# Patient Record
Sex: MALE | Race: WHITE | Employment: FULL TIME | URBAN - METROPOLITAN AREA
[De-identification: names, ages, dates, MRNs, and addresses within clinical notes are randomized per-mention and may not be internally consistent; named-entity substitution may affect disease eponyms.]

---

## 2019-04-27 ENCOUNTER — APPOINTMENT (OUTPATIENT)
Dept: CT IMAGING | Age: 34
End: 2019-04-27
Attending: NURSE PRACTITIONER
Payer: SELF-PAY

## 2019-04-27 ENCOUNTER — HOSPITAL ENCOUNTER (EMERGENCY)
Age: 34
Discharge: LEFT BEFORE DISCHARGE | End: 2019-04-27
Attending: EMERGENCY MEDICINE | Admitting: EMERGENCY MEDICINE
Payer: SELF-PAY

## 2019-04-27 VITALS
DIASTOLIC BLOOD PRESSURE: 107 MMHG | WEIGHT: 305 LBS | HEIGHT: 68 IN | HEART RATE: 114 BPM | TEMPERATURE: 99.8 F | SYSTOLIC BLOOD PRESSURE: 164 MMHG | BODY MASS INDEX: 46.23 KG/M2 | RESPIRATION RATE: 17 BRPM | OXYGEN SATURATION: 99 %

## 2019-04-27 DIAGNOSIS — R19.7 DIARRHEA, UNSPECIFIED TYPE: ICD-10-CM

## 2019-04-27 DIAGNOSIS — R10.12 ABDOMINAL PAIN, LUQ (LEFT UPPER QUADRANT): Primary | ICD-10-CM

## 2019-04-27 LAB
ALBUMIN SERPL-MCNC: 4.1 G/DL (ref 3.5–5)
ALBUMIN/GLOB SERPL: 1.1 {RATIO} (ref 1.1–2.2)
ALP SERPL-CCNC: 98 U/L (ref 45–117)
ALT SERPL-CCNC: 35 U/L (ref 12–78)
ANION GAP SERPL CALC-SCNC: 6 MMOL/L (ref 5–15)
APPEARANCE UR: CLEAR
AST SERPL-CCNC: 16 U/L (ref 15–37)
BASOPHILS # BLD: 0 K/UL (ref 0–0.1)
BASOPHILS NFR BLD: 0 % (ref 0–1)
BILIRUB SERPL-MCNC: 0.6 MG/DL (ref 0.2–1)
BILIRUB UR QL: NEGATIVE
BUN SERPL-MCNC: 11 MG/DL (ref 6–20)
BUN/CREAT SERPL: 9 (ref 12–20)
CALCIUM SERPL-MCNC: 9.1 MG/DL (ref 8.5–10.1)
CHLORIDE SERPL-SCNC: 104 MMOL/L (ref 97–108)
CO2 SERPL-SCNC: 26 MMOL/L (ref 21–32)
COLOR UR: NORMAL
COMMENT, HOLDF: NORMAL
CREAT SERPL-MCNC: 1.21 MG/DL (ref 0.7–1.3)
DIFFERENTIAL METHOD BLD: ABNORMAL
EOSINOPHIL # BLD: 0 K/UL (ref 0–0.4)
EOSINOPHIL NFR BLD: 0 % (ref 0–7)
ERYTHROCYTE [DISTWIDTH] IN BLOOD BY AUTOMATED COUNT: 12.6 % (ref 11.5–14.5)
GLOBULIN SER CALC-MCNC: 3.9 G/DL (ref 2–4)
GLUCOSE SERPL-MCNC: 92 MG/DL (ref 65–100)
GLUCOSE UR STRIP.AUTO-MCNC: NEGATIVE MG/DL
HCT VFR BLD AUTO: 47.1 % (ref 36.6–50.3)
HGB BLD-MCNC: 15.5 G/DL (ref 12.1–17)
HGB UR QL STRIP: NEGATIVE
IMM GRANULOCYTES # BLD AUTO: 0 K/UL (ref 0–0.04)
IMM GRANULOCYTES NFR BLD AUTO: 0 % (ref 0–0.5)
KETONES UR QL STRIP.AUTO: NEGATIVE MG/DL
LEUKOCYTE ESTERASE UR QL STRIP.AUTO: NEGATIVE
LIPASE SERPL-CCNC: 66 U/L (ref 73–393)
LYMPHOCYTES # BLD: 1.2 K/UL (ref 0.8–3.5)
LYMPHOCYTES NFR BLD: 13 % (ref 12–49)
MCH RBC QN AUTO: 29.5 PG (ref 26–34)
MCHC RBC AUTO-ENTMCNC: 32.9 G/DL (ref 30–36.5)
MCV RBC AUTO: 89.7 FL (ref 80–99)
MONOCYTES # BLD: 0.5 K/UL (ref 0–1)
MONOCYTES NFR BLD: 5 % (ref 5–13)
NEUTS SEG # BLD: 7.5 K/UL (ref 1.8–8)
NEUTS SEG NFR BLD: 82 % (ref 32–75)
NITRITE UR QL STRIP.AUTO: NEGATIVE
NRBC # BLD: 0 K/UL (ref 0–0.01)
NRBC BLD-RTO: 0 PER 100 WBC
PH UR STRIP: 5.5 [PH] (ref 5–8)
PLATELET # BLD AUTO: 179 K/UL (ref 150–400)
PMV BLD AUTO: 9.7 FL (ref 8.9–12.9)
POTASSIUM SERPL-SCNC: 3.9 MMOL/L (ref 3.5–5.1)
PROT SERPL-MCNC: 8 G/DL (ref 6.4–8.2)
PROT UR STRIP-MCNC: NEGATIVE MG/DL
RBC # BLD AUTO: 5.25 M/UL (ref 4.1–5.7)
SAMPLES BEING HELD,HOLD: NORMAL
SODIUM SERPL-SCNC: 136 MMOL/L (ref 136–145)
SP GR UR REFRACTOMETRY: 1.03 (ref 1–1.03)
UROBILINOGEN UR QL STRIP.AUTO: 0.2 EU/DL (ref 0.2–1)
WBC # BLD AUTO: 9.3 K/UL (ref 4.1–11.1)

## 2019-04-27 PROCEDURE — 85025 COMPLETE CBC W/AUTO DIFF WBC: CPT

## 2019-04-27 PROCEDURE — 81003 URINALYSIS AUTO W/O SCOPE: CPT

## 2019-04-27 PROCEDURE — 96372 THER/PROPH/DIAG INJ SC/IM: CPT

## 2019-04-27 PROCEDURE — 74011250636 HC RX REV CODE- 250/636: Performed by: NURSE PRACTITIONER

## 2019-04-27 PROCEDURE — 74011250637 HC RX REV CODE- 250/637: Performed by: NURSE PRACTITIONER

## 2019-04-27 PROCEDURE — 36415 COLL VENOUS BLD VENIPUNCTURE: CPT

## 2019-04-27 PROCEDURE — 80053 COMPREHEN METABOLIC PANEL: CPT

## 2019-04-27 PROCEDURE — 83690 ASSAY OF LIPASE: CPT

## 2019-04-27 PROCEDURE — 99283 EMERGENCY DEPT VISIT LOW MDM: CPT

## 2019-04-27 RX ORDER — FAMOTIDINE 20 MG/1
20 TABLET, FILM COATED ORAL 2 TIMES DAILY
Qty: 20 TAB | Refills: 0 | Status: SHIPPED | OUTPATIENT
Start: 2019-04-27 | End: 2019-05-07

## 2019-04-27 RX ORDER — ONDANSETRON 4 MG/1
8 TABLET, ORALLY DISINTEGRATING ORAL
Status: COMPLETED | OUTPATIENT
Start: 2019-04-27 | End: 2019-04-27

## 2019-04-27 RX ORDER — DICYCLOMINE HYDROCHLORIDE 10 MG/ML
20 INJECTION INTRAMUSCULAR
Status: COMPLETED | OUTPATIENT
Start: 2019-04-27 | End: 2019-04-27

## 2019-04-27 RX ORDER — ONDANSETRON 4 MG/1
4 TABLET, FILM COATED ORAL
Qty: 10 TAB | Refills: 0 | Status: SHIPPED | OUTPATIENT
Start: 2019-04-27 | End: 2022-10-16

## 2019-04-27 RX ADMIN — DICYCLOMINE HYDROCHLORIDE 20 MG: 20 INJECTION, SOLUTION INTRAMUSCULAR at 11:31

## 2019-04-27 RX ADMIN — ONDANSETRON 8 MG: 4 TABLET, ORALLY DISINTEGRATING ORAL at 11:32

## 2019-04-27 NOTE — ED TRIAGE NOTES
Pt states that he has had diarrhea for the past 4 days and developed abd pain across his upper abd.  Pt states that he has had no N/V.

## 2019-04-27 NOTE — ED PROVIDER NOTES
Pt is a 34 y/o male with a pmh of HTN who presents today with c/o sharp stabbing pain to LUQ abdomen that radiates to epigastric area that started last night. Woke him up from sleep. Comes and goes. Lastsfor approx 2 minutes then resolves. Has not eaten so unsure if food makes it worse but he went to work and was on a piece of heavy machinery of which the bouncing made it worse. No associated n/v/f but reports having chills. He does report non bloody diarrhea that started 4 days ago. Multiple episodes yesterday. Once today. He has taken no medications for this. No other complaints. Past Medical History:   Diagnosis Date    Hypertension     Obesity, morbid, BMI 40.0-49.9 ([facility] [address]     2016       Past Surgical History:   Procedure Laterality Date    HX ORTHOPAEDIC      R knee         Family History:   Problem Relation Age of Onset    Hypertension Other        Social History     Socioeconomic History    Marital status: SINGLE     Spouse name: Not on file    Number of children: Not on file    Years of education: Not on file    Highest education level: Not on file   Occupational History    Not on file   Social Needs    Financial resource strain: Not on file    Food insecurity:     Worry: Not on file     Inability: Not on file    Transportation needs:     Medical: Not on file     Non-medical: Not on file   Tobacco Use    Smoking status: Current Every Day Smoker   Substance and Sexual Activity    Alcohol use:  Yes    Drug use: No    Sexual activity: Not on file   Lifestyle    Physical activity:     Days per week: Not on file     Minutes per session: Not on file    Stress: Not on file   Relationships    Social connections:     Talks on phone: Not on file     Gets together: Not on file     Attends Restoration service: Not on file     Active member of club or organization: Not on file     Attends meetings of clubs or organizations: Not on file     Relationship status: Not on file    Intimate partner violence:     Fear of current or ex partner: Not on file     Emotionally abused: Not on file     Physically abused: Not on file     Forced sexual activity: Not on file   Other Topics Concern    Not on file   Social History Narrative    Not on file         ALLERGIES: Patient has no known allergies. Review of Systems   Constitutional: Positive for chills. Negative for appetite change and fever. HENT: Negative. Respiratory: Negative for chest tightness and shortness of breath. Cardiovascular: Negative for chest pain. Gastrointestinal: Positive for abdominal pain and diarrhea. Negative for nausea and vomiting. Genitourinary: Negative for difficulty urinating. Musculoskeletal: Negative. Skin: Negative. Neurological: Negative for dizziness and headaches. All other systems reviewed and are negative. Vitals:    04/27/19 0944 04/27/19 0955   BP:  159/85   Pulse: (!) 108 (!) 107   Resp:  18   Temp:  98.6 °F (37 °C)   SpO2: 99% 98%   Weight:  124.3 kg (274 lb 0.5 oz)   Height:  5' 8\" (1.727 m)            Physical Exam   Constitutional: He is oriented to person, place, and time. He appears well-developed and well-nourished. Cardiovascular: Normal rate, regular rhythm, normal heart sounds and intact distal pulses. Pulmonary/Chest: Effort normal and breath sounds normal. No respiratory distress. Abdominal: Soft. Normal appearance and bowel sounds are normal. There is tenderness in the epigastric area and left upper quadrant. Neurological: He is alert and oriented to person, place, and time. Skin: Skin is warm and dry. Psychiatric: He has a normal mood and affect. His behavior is normal.   Nursing note and vitals reviewed.        MDM  Number of Diagnoses or Management Options  Abdominal pain, LUQ (left upper quadrant):   Diarrhea, unspecified type:   Diagnosis management comments: Patient presents with less than one day of left upper quadrant abdominal pain radiating across his upper abdomen that is sharp in nature and lasts for a couple minutes and resolves. On exam he has very mild discomfort in upper abdomen but otherwise benign. He has a normal wbc and urine as well as lft and lipase. Possible gastritis vs gerd vs cholelithiasis. He continues to have discomfort and is HR remains elevated. After discussion we decided to get a CT scan of abd pelvis for further evaluation. However pt eloped without discussing with anyone. Unable to locate the patient.           Procedures

## 2019-04-27 NOTE — ED NOTES
Patient has left prior to completion of testing, voicemail left for patient to contact ED when message received.   ER NP aware

## 2019-04-27 NOTE — DISCHARGE INSTRUCTIONS

## 2019-08-19 ENCOUNTER — HOSPITAL ENCOUNTER (EMERGENCY)
Age: 34
Discharge: HOME | End: 2019-08-19
Attending: EMERGENCY MEDICINE
Payer: SELF-PAY

## 2019-08-19 VITALS
BODY MASS INDEX: 45.47 KG/M2 | TEMPERATURE: 98.1 F | DIASTOLIC BLOOD PRESSURE: 65 MMHG | RESPIRATION RATE: 14 BRPM | OXYGEN SATURATION: 98 % | HEIGHT: 68 IN | HEART RATE: 110 BPM | SYSTOLIC BLOOD PRESSURE: 147 MMHG | WEIGHT: 300 LBS

## 2019-08-19 DIAGNOSIS — S80.11XA CONTUSION OF RIGHT LOWER EXTREMITY, INITIAL ENCOUNTER: ICD-10-CM

## 2019-08-19 DIAGNOSIS — S80.811A ABRASION OF RIGHT LOWER EXTREMITY, INITIAL ENCOUNTER: Primary | ICD-10-CM

## 2019-08-19 PROCEDURE — 74011250636 HC RX REV CODE- 250/636: Performed by: EMERGENCY MEDICINE

## 2019-08-19 PROCEDURE — 96372 THER/PROPH/DIAG INJ SC/IM: CPT

## 2019-08-19 PROCEDURE — 90715 TDAP VACCINE 7 YRS/> IM: CPT | Performed by: EMERGENCY MEDICINE

## 2019-08-19 PROCEDURE — 99281 EMR DPT VST MAYX REQ PHY/QHP: CPT

## 2019-08-19 PROCEDURE — 90471 IMMUNIZATION ADMIN: CPT

## 2019-08-19 RX ADMIN — TETANUS TOXOID, REDUCED DIPHTHERIA TOXOID AND ACELLULAR PERTUSSIS VACCINE, ADSORBED 0.5 ML: 5; 2.5; 8; 8; 2.5 SUSPENSION INTRAMUSCULAR at 19:53

## 2019-08-19 NOTE — ED PROVIDER NOTES
The history is provided by the patient. Leg Injury    This is a new problem. The current episode started 6 to 12 hours ago. The problem occurs constantly. The problem has been gradually worsening. The pain is present in the right lower leg. The quality of the pain is described as aching. The pain is mild. Associated symptoms include numbness and stiffness. Pertinent negatives include full range of motion. The symptoms are aggravated by movement and palpation. He has tried nothing for the symptoms. The treatment provided no relief. There has been a history of trauma (a pipe fell on the shin earlier today). Past Medical History:   Diagnosis Date    Hypertension     Obesity, morbid, BMI 40.0-49.9 ([facility] [address]     2016       Past Surgical History:   Procedure Laterality Date    HX ORTHOPAEDIC      R knee         Family History:   Problem Relation Age of Onset    Hypertension Other        Social History     Socioeconomic History    Marital status: SINGLE     Spouse name: Not on file    Number of children: Not on file    Years of education: Not on file    Highest education level: Not on file   Occupational History    Not on file   Social Needs    Financial resource strain: Not on file    Food insecurity:     Worry: Not on file     Inability: Not on file    Transportation needs:     Medical: Not on file     Non-medical: Not on file   Tobacco Use    Smoking status: Current Every Day Smoker   Substance and Sexual Activity    Alcohol use:  Yes    Drug use: No    Sexual activity: Not on file   Lifestyle    Physical activity:     Days per week: Not on file     Minutes per session: Not on file    Stress: Not on file   Relationships    Social connections:     Talks on phone: Not on file     Gets together: Not on file     Attends [employer] service: Not on file     Active member of club or organization: Not on file     Attends meetings of clubs or organizations: Not on file     Relationship status: Not on file  Intimate partner violence:     Fear of current or ex partner: Not on file     Emotionally abused: Not on file     Physically abused: Not on file     Forced sexual activity: Not on file   Other Topics Concern    Not on file   Social History Narrative    Not on file         ALLERGIES: Patient has no known allergies. Review of Systems   Constitutional: Negative for chills and fever. Respiratory: Negative for shortness of breath. Cardiovascular: Negative for chest pain. Gastrointestinal: Negative for abdominal pain, constipation, diarrhea and vomiting. Musculoskeletal: Positive for myalgias and stiffness. Neurological: Positive for numbness. Negative for dizziness and light-headedness. All other systems reviewed and are negative. There were no vitals filed for this visit. Physical Exam   Constitutional: He appears well-developed and well-nourished. No distress. HENT:   Head: Normocephalic and atraumatic. Eyes: Pupils are equal, round, and reactive to light. Conjunctivae are normal.   Neck: Normal range of motion. Cardiovascular: Normal rate and regular rhythm. Pulmonary/Chest: Effort normal and breath sounds normal.   Abdominal: Soft. He exhibits no distension. There is no tenderness. Musculoskeletal: Normal range of motion. Right lower leg: He exhibits no swelling and no deformity. Abrasion to right shin with associated erythema   Neurological: He is alert. Skin: Skin is dry. Capillary refill takes less than 2 seconds. Nursing note and vitals reviewed. MDM  Number of Diagnoses or Management Options  Diagnosis management comments: Pt presents with an extremity injury. No evidence of fracture, dislocation, or other significant musculoskeletal injury. Patient was discharged home with a plan for pain control as well as instructions on managing his injuries and precautions for returning to the emergency department.  No evidence of compartment syndrome on evaluation. Patient will be discharged home to follow-up with primary care provider as instructed in discharge paperwork. Patient and family expressed understanding and agreed with plan. Procedures      The patient's results have been reviewed with them and/or available family. Patient and/or family verbally conveyed their understanding and agreement of the patient's signs, symptoms, diagnosis, treatment and prognosis and additionally agree to follow up as recommended in the discharge instructions or to return to the Emergency Room should their condition change prior to their follow-up appointment. The patient/family verbally agrees with the care-plan and verbally conveys that all of their questions have been answered. The discharge instructions have also been provided to the patient and/or family with some educational information regarding the patient's diagnosis as well a list of reasons why the patient would want to return to the ER prior to their follow-up appointment, should their condition change.

## 2019-09-22 ENCOUNTER — ED HISTORICAL/CONVERTED ENCOUNTER (OUTPATIENT)
Dept: OTHER | Age: 34
End: 2019-09-22

## 2019-11-14 ENCOUNTER — OFFICE VISIT (OUTPATIENT)
Dept: CARDIOLOGY CLINIC | Age: 34
End: 2019-11-14

## 2019-11-14 VITALS
WEIGHT: 315 LBS | SYSTOLIC BLOOD PRESSURE: 180 MMHG | DIASTOLIC BLOOD PRESSURE: 90 MMHG | HEIGHT: 68 IN | HEART RATE: 84 BPM | BODY MASS INDEX: 47.74 KG/M2

## 2019-11-14 DIAGNOSIS — E66.01 OBESITY, MORBID (HCC): ICD-10-CM

## 2019-11-14 DIAGNOSIS — R07.89 CHEST DISCOMFORT: Primary | ICD-10-CM

## 2019-11-14 DIAGNOSIS — Z82.49 FAMILY HISTORY OF EARLY CAD: ICD-10-CM

## 2019-11-14 DIAGNOSIS — R06.02 SOB (SHORTNESS OF BREATH): ICD-10-CM

## 2019-11-14 DIAGNOSIS — I10 ESSENTIAL HYPERTENSION: ICD-10-CM

## 2019-11-14 RX ORDER — AMLODIPINE BESYLATE 5 MG/1
5 TABLET ORAL DAILY
Qty: 30 TAB | Refills: 3 | Status: CANCELLED | OUTPATIENT
Start: 2019-11-14

## 2019-11-14 RX ORDER — AMLODIPINE BESYLATE 5 MG/1
5 TABLET ORAL DAILY
Qty: 30 TAB | Refills: 6 | Status: SHIPPED | OUTPATIENT
Start: 2019-11-14 | End: 2022-10-16

## 2019-11-14 RX ORDER — LISINOPRIL 20 MG/1
TABLET ORAL DAILY
COMMUNITY
End: 2022-10-16

## 2019-11-14 NOTE — PATIENT INSTRUCTIONS
1) echocardiogram  2) stress test  3) Liver function test  4) cholesteorl panel  5) CBC, CMP, TSH  6) sleep evaluation  7) take in am amlodipine 5 mg [employer] [place]  8) follow BP at home

## 2019-11-14 NOTE — PROGRESS NOTES
LAST OFFICE VISIT : First visit        ICD-10-CM ICD-9-CM   1. Chest discomfort R07.89 786.59   2. SOB (shortness of breath) R06.02 786.05   3. Essential hypertension I10 401.9   4. Family history of early CAD Z82.49 V17.3   5. Obesity, morbid ([facility] [address] E66.01 278.01            Harmony Gil is a 29 y.o. male here for evaluation of family hx of CAD. Cardiac risk factors: smoking/ tobacco exposure, family history, obesity, male gender, hypertension  I have personally obtained the history from the patient. HISTORY OF PRESENTING ILLNESS     Harmony Gil reports he comes in today d/t strong family hx of CAD. His father just passed away from severe CAD with his first MI at age 28 and passing at 61. He states recently he has noticed increasing SOB and chest pain walking up 18 steps to his apt ongoing for the past 2 months. He has to stop care home up the stairs to rest. He does physical work at his job and has no issues. He describes dizziness and lightheadedness at night, after it gets dark. This happens while driving, sitting at home, or in bed. He was hospitalized in Sept at [address] [place] [address] for a leg wound that went septic. He may have some sleep apnea per his wife's hx. His mother is in the room as well. He sleeps on 2 pillows. He started Lisinopril in September, but has not had blood work. He stopped drinking 6 years ago. He uses smokeless tobacco.     Risk factors include HTN, family hx, obesity, unknown cholesterol profile. The patient denies orthopnea, PND, LE edema, palpitations, syncope, presyncope or fatigue. ACTIVE PROBLEM LIST     Patient Active Problem List    Diagnosis Date Noted    Obesity, morbid ([facility] [address] 11/14/2019    Chest discomfort 11/14/2019    SOB (shortness of breath) 11/14/2019           PAST MEDICAL HISTORY     No past medical history on file. PAST SURGICAL HISTORY     No past surgical history on file.        ALLERGIES     No Known Allergies       FAMILY HISTORY No family history on file. negative for cardiac disease       SOCIAL HISTORY     Social History     Socioeconomic History    Marital status:      Spouse name: Not on file    Number of children: Not on file    Years of education: Not on file    Highest education level: Not on file   Tobacco Use    Smoking status: Current Every Day Smoker    Smokeless tobacco: Current User     Types: Snuff         MEDICATIONS     Current Outpatient Medications   Medication Sig    lisinopril (PRINIVIL, ZESTRIL) 20 mg tablet Take  by mouth daily.  amLODIPine (NORVASC) 5 mg tablet Take 1 Tab by mouth daily. No current facility-administered medications for this visit. I have reviewed the nurses notes, vitals, problem list, allergy list, medical history, family, social history and medications. REVIEW OF SYMPTOMS      General: Pt denies excessive weight gain or loss. Pt is able to conduct ADL's +dizziness, lightheadedness  HEENT: Denies blurred vision, headaches, hearing loss, epistaxis and difficulty swallowing. Respiratory: Denies cough, congestion, wheezing or stridor. +SOB  Cardiovascular: Denies precordial pain, palpitations, edema or PND  +chest pain  Gastrointestinal: Denies poor appetite, indigestion, abdominal pain or blood in stool  Genitourinary: Denies hematuria, dysuria, increased urinary frequency  Musculoskeletal: Denies joint pain or swelling from muscles or joints  Neurologic: Denies tremor, paresthesias, headache, or sensory motor disturbance  Psychiatric: Denies confusion, insomnia, depression  Integumentray: Denies rash, itching or ulcers. Hematologic: Denies easy bruising, bleeding     PHYSICAL EXAMINATION      Vitals:    11/14/19 0900   BP: 180/90   Pulse: 84   Weight: 336 lb (152.4 kg)   Height: 5' 8\" (1.727 m)     General: Well developed, in no acute distress.   HEENT: No jaundice, oral mucosa moist, no oral ulcers  Neck: Supple, no stiffness, no lymphadenopathy, supple  Heart: Normal S1/S2 negative S3 or S4. Regular, no murmur, gallop or rub, no jugular venous distention  Respiratory: Clear bilaterally x 4, no wheezing or rales  Abdomen:   Soft, non-tender, bowel sounds are active. Extremities:  No edema, normal cap refill, no cyanosis. Musculoskeletal: No clubbing, no deformities  Neuro: A&Ox3, speech clear, gait stable, cooperative, no focal neurologic deficits  Skin: Skin color is normal. No rashes or lesions. Non diaphoretic, moist.  Vascular: 2+ pulses symmetric in all extremities        EK19 - NSR     DIAGNOSTIC DATA     No specialty comments available. LABORATORY DATA          No results found for: WBC, HGBPOC, HGB, HGBP, HCTPOC, HCT, PHCT, RBCH, PLT, MCV, HGBEXT, HCTEXT, PLTEXT, HGBEXT, HCTEXT, PLTEXT   No results found for: NA, K, CL, CO2, AGAP, GLU, BUN, CREA, BUCR, GFRAA, GFRNA, CA, TBIL, TBILI, GPT, SGOT, AP, TP, ALB, GLOB, AGRAT, ALT        ASSESSMENT/RECOMMENDATIONS:.      1. Chest discomfort/SOB  - Strong family hx of heart disease and other risk factors that are concerning.  - Favor morning forward with exercise stress test and echo secondary to SOB    2. HTN  - Will start Norvasc 5mg/d in the morning, continue Lisinopril 20mg/d in the evening  - Will check CMP to look at his CRE on Lisinopril     3. Strong family hx of early CAD  - Needs aggressive workup and risk factor modification  - he is motivated. He stopped smoking but now chews tobacco  - He needs to start daily exercise. - The ball is in his court about how he wants to proceed    4. Possible sleep apnea  - Will obtain a sleep study    5. Screening cholesterol  - Will obtain a cholesterol and liver profile. 5. Morbid Obesity  - I suggested that once all testing is done, see a doctor for obesity management. Options include [facility].      Orders Placed This Encounter    LIPID PANEL     Standing Status:   Future     Standing Expiration Date:   2020    HEPATIC FUNCTION PANEL Standing Status:   Future     Standing Expiration Date:   11/14/2020    CBC W/O DIFF     Standing Status:   Future     Standing Expiration Date:   82/59/9332    METABOLIC PANEL, COMPREHENSIVE     Standing Status:   Future     Standing Expiration Date:   11/14/2020    TSH 3RD GENERATION     Standing Status:   Future     Standing Expiration Date:   11/14/2020    AMB POC EKG ROUTINE W/ 12 LEADS, INTER & REP     Order Specific Question:   Reason for Exam:     Answer:   family hx of CAD    lisinopril (PRINIVIL, ZESTRIL) 20 mg tablet     Sig: Take  by mouth daily.  amLODIPine (NORVASC) 5 mg tablet     Sig: Take 1 Tab by mouth daily. Dispense:  30 Tab     Refill:  6     Take every morning              I have discussed the diagnosis with  Radha Colon and the intended plan as seen in the above orders. Questions were answered concerning future plans. I have discussed medication side effects and warnings with the patient as well. Thank you,  No primary care provider on file. for involving me in the care of  Lulla Colon. Please do not hesitate to contact me for further questions/concerns. Written by Razia Cruz, as dictated by Jenna Robert MD.      Jerica Lewis. MD [place], [facility]    No care team member to display    [place] [id number]      [address] [place] [address], [address] [place] [address]      [phone] / [phone] Fax

## 2020-08-27 ENCOUNTER — ED HISTORICAL/CONVERTED ENCOUNTER (OUTPATIENT)
Dept: OTHER | Age: 35
End: 2020-08-27

## 2022-03-19 PROBLEM — R06.02 SOB (SHORTNESS OF BREATH): Status: ACTIVE | Noted: 2019-11-14

## 2022-03-19 PROBLEM — R07.89 CHEST DISCOMFORT: Status: ACTIVE | Noted: 2019-11-14

## 2022-03-20 PROBLEM — E66.01 OBESITY, MORBID (HCC): Status: ACTIVE | Noted: 2019-11-14

## 2022-10-16 ENCOUNTER — HOSPITAL ENCOUNTER (INPATIENT)
Age: 37
LOS: 1 days | Discharge: HOME | DRG: 303 | End: 2022-10-17
Attending: EMERGENCY MEDICINE | Admitting: HOSPITALIST
Payer: COMMERCIAL

## 2022-10-16 ENCOUNTER — APPOINTMENT (OUTPATIENT)
Dept: GENERAL RADIOLOGY | Age: 37
DRG: 303 | End: 2022-10-16
Attending: EMERGENCY MEDICINE
Payer: COMMERCIAL

## 2022-10-16 DIAGNOSIS — Z53.29 LEFT AGAINST MEDICAL ADVICE: ICD-10-CM

## 2022-10-16 DIAGNOSIS — R07.9 ACUTE CHEST PAIN: Primary | ICD-10-CM

## 2022-10-16 LAB
ALBUMIN SERPL-MCNC: 3.8 G/DL (ref 3.5–5)
ALBUMIN/GLOB SERPL: 1.1 {RATIO} (ref 1.1–2.2)
ALP SERPL-CCNC: 72 U/L (ref 45–117)
ALT SERPL-CCNC: 31 U/L (ref 12–78)
ANION GAP SERPL CALC-SCNC: 4 MMOL/L (ref 5–15)
AST SERPL-CCNC: 20 U/L (ref 15–37)
BASOPHILS # BLD: 0.1 K/UL (ref 0–0.1)
BASOPHILS NFR BLD: 1 % (ref 0–1)
BILIRUB SERPL-MCNC: 0.3 MG/DL (ref 0.2–1)
BNP SERPL-MCNC: 78 PG/ML
BUN SERPL-MCNC: 20 MG/DL (ref 6–20)
BUN/CREAT SERPL: 17 (ref 12–20)
CALCIUM SERPL-MCNC: 9 MG/DL (ref 8.5–10.1)
CHLORIDE SERPL-SCNC: 107 MMOL/L (ref 97–108)
CO2 SERPL-SCNC: 29 MMOL/L (ref 21–32)
COMMENT, HOLDF: NORMAL
COMMENT, HOLDF: NORMAL
CREAT SERPL-MCNC: 1.19 MG/DL (ref 0.7–1.3)
D DIMER PPP FEU-MCNC: 0.36 MG/L FEU (ref 0–0.65)
DIFFERENTIAL METHOD BLD: NORMAL
EOSINOPHIL # BLD: 0.3 K/UL (ref 0–0.4)
EOSINOPHIL NFR BLD: 4 % (ref 0–7)
ERYTHROCYTE [DISTWIDTH] IN BLOOD BY AUTOMATED COUNT: 12.5 % (ref 11.5–14.5)
GLOBULIN SER CALC-MCNC: 3.4 G/DL (ref 2–4)
GLUCOSE SERPL-MCNC: 110 MG/DL (ref 65–100)
HCT VFR BLD AUTO: 40.1 % (ref 36.6–50.3)
HGB BLD-MCNC: 13.1 G/DL (ref 12.1–17)
IMM GRANULOCYTES # BLD AUTO: 0 K/UL (ref 0–0.04)
IMM GRANULOCYTES NFR BLD AUTO: 0 % (ref 0–0.5)
LYMPHOCYTES # BLD: 2.8 K/UL (ref 0.8–3.5)
LYMPHOCYTES NFR BLD: 33 % (ref 12–49)
MAGNESIUM SERPL-MCNC: 2 MG/DL (ref 1.6–2.4)
MCH RBC QN AUTO: 30.1 PG (ref 26–34)
MCHC RBC AUTO-ENTMCNC: 32.7 G/DL (ref 30–36.5)
MCV RBC AUTO: 92.2 FL (ref 80–99)
MONOCYTES # BLD: 0.6 K/UL (ref 0–1)
MONOCYTES NFR BLD: 7 % (ref 5–13)
NEUTS SEG # BLD: 4.9 K/UL (ref 1.8–8)
NEUTS SEG NFR BLD: 55 % (ref 32–75)
NRBC # BLD: 0 K/UL (ref 0–0.01)
NRBC BLD-RTO: 0 PER 100 WBC
PHOSPHATE SERPL-MCNC: 3.8 MG/DL (ref 2.6–4.7)
PLATELET # BLD AUTO: 213 K/UL (ref 150–400)
PMV BLD AUTO: 9.7 FL (ref 8.9–12.9)
POTASSIUM SERPL-SCNC: 3.8 MMOL/L (ref 3.5–5.1)
PROT SERPL-MCNC: 7.2 G/DL (ref 6.4–8.2)
RBC # BLD AUTO: 4.35 M/UL (ref 4.1–5.7)
SAMPLES BEING HELD,HOLD: NORMAL
SAMPLES BEING HELD,HOLD: NORMAL
SODIUM SERPL-SCNC: 140 MMOL/L (ref 136–145)
TROPONIN-HIGH SENSITIVITY: 9 NG/L (ref 0–76)
WBC # BLD AUTO: 8.7 K/UL (ref 4.1–11.1)

## 2022-10-16 PROCEDURE — 74011250637 HC RX REV CODE- 250/637: Performed by: EMERGENCY MEDICINE

## 2022-10-16 PROCEDURE — 80053 COMPREHEN METABOLIC PANEL: CPT

## 2022-10-16 PROCEDURE — 93005 ELECTROCARDIOGRAM TRACING: CPT

## 2022-10-16 PROCEDURE — 83735 ASSAY OF MAGNESIUM: CPT

## 2022-10-16 PROCEDURE — 84484 ASSAY OF TROPONIN QUANT: CPT

## 2022-10-16 PROCEDURE — 36415 COLL VENOUS BLD VENIPUNCTURE: CPT

## 2022-10-16 PROCEDURE — 85379 FIBRIN DEGRADATION QUANT: CPT

## 2022-10-16 PROCEDURE — 84100 ASSAY OF PHOSPHORUS: CPT

## 2022-10-16 PROCEDURE — 74011250636 HC RX REV CODE- 250/636: Performed by: EMERGENCY MEDICINE

## 2022-10-16 PROCEDURE — 94762 N-INVAS EAR/PLS OXIMTRY CONT: CPT

## 2022-10-16 PROCEDURE — 71045 X-RAY EXAM CHEST 1 VIEW: CPT

## 2022-10-16 PROCEDURE — 83880 ASSAY OF NATRIURETIC PEPTIDE: CPT

## 2022-10-16 PROCEDURE — 85025 COMPLETE CBC W/AUTO DIFF WBC: CPT

## 2022-10-16 PROCEDURE — 99285 EMERGENCY DEPT VISIT HI MDM: CPT

## 2022-10-16 RX ORDER — CLOPIDOGREL BISULFATE 75 MG/1
75 TABLET ORAL
COMMUNITY

## 2022-10-16 RX ORDER — METOPROLOL TARTRATE 25 MG/1
TABLET, FILM COATED ORAL 2 TIMES DAILY
COMMUNITY

## 2022-10-16 RX ORDER — GUAIFENESIN 100 MG/5ML
81 LIQUID (ML) ORAL DAILY
COMMUNITY

## 2022-10-16 RX ORDER — ATORVASTATIN CALCIUM 10 MG/1
TABLET, FILM COATED ORAL DAILY
COMMUNITY

## 2022-10-16 RX ORDER — ASPIRIN 325 MG
325 TABLET ORAL
Status: COMPLETED | OUTPATIENT
Start: 2022-10-16 | End: 2022-10-16

## 2022-10-16 RX ADMIN — NITROGLYCERIN 0.5 INCH: 20 OINTMENT TOPICAL at 21:16

## 2022-10-16 RX ADMIN — ASPIRIN 325 MG: 325 TABLET ORAL at 21:14

## 2022-10-16 RX ADMIN — SODIUM CHLORIDE 1000 ML: 9 INJECTION, SOLUTION INTRAVENOUS at 21:13

## 2022-10-16 NOTE — Clinical Note
Status[de-identified] INPATIENT [101]   Type of Bed: Medical [8]   Cardiac Monitoring Required?: Yes   Inpatient Hospitalization Certified Necessary for the Following Reasons: 9.  Other (further clarification in H&P documentation)   Admitting Diagnosis: Angina at rest [facility]) [[id number]]   Admitting Physician: Jolynn Naik   Attending Physician: Merly Oakley [[id number]]   Estimated Length of Stay: 2 Midnights   Discharge Plan[de-identified] Home with Office Follow-up

## 2022-10-17 VITALS
WEIGHT: 300 LBS | DIASTOLIC BLOOD PRESSURE: 49 MMHG | BODY MASS INDEX: 45.47 KG/M2 | SYSTOLIC BLOOD PRESSURE: 108 MMHG | TEMPERATURE: 98.4 F | OXYGEN SATURATION: 95 % | HEART RATE: 60 BPM | HEIGHT: 68 IN | RESPIRATION RATE: 17 BRPM

## 2022-10-17 PROBLEM — I20.89 ANGINA AT REST: Status: ACTIVE | Noted: 2022-10-17

## 2022-10-17 PROBLEM — I20.8 ANGINA AT REST (HCC): Status: ACTIVE | Noted: 2022-10-17

## 2022-10-17 LAB — TROPONIN-HIGH SENSITIVITY: 10 NG/L (ref 0–76)

## 2022-10-17 PROCEDURE — 65270000029 HC RM PRIVATE

## 2022-10-17 RX ORDER — SODIUM CHLORIDE 0.9 % (FLUSH) 0.9 %
5-40 SYRINGE (ML) INJECTION AS NEEDED
Status: CANCELLED | OUTPATIENT
Start: 2022-10-17

## 2022-10-17 RX ORDER — ATORVASTATIN CALCIUM 20 MG/1
10 TABLET, FILM COATED ORAL DAILY
Status: CANCELLED | OUTPATIENT
Start: 2022-10-17

## 2022-10-17 RX ORDER — POLYETHYLENE GLYCOL 3350 17 G/17G
17 POWDER, FOR SOLUTION ORAL DAILY PRN
Status: CANCELLED | OUTPATIENT
Start: 2022-10-17

## 2022-10-17 RX ORDER — SODIUM CHLORIDE 0.9 % (FLUSH) 0.9 %
5-40 SYRINGE (ML) INJECTION EVERY 8 HOURS
Status: CANCELLED | OUTPATIENT
Start: 2022-10-17

## 2022-10-17 RX ORDER — CLOPIDOGREL BISULFATE 75 MG/1
75 TABLET ORAL DAILY
Status: CANCELLED | OUTPATIENT
Start: 2022-10-17

## 2022-10-17 RX ORDER — GUAIFENESIN 100 MG/5ML
81 LIQUID (ML) ORAL DAILY
Status: CANCELLED | OUTPATIENT
Start: 2022-10-17

## 2022-10-17 RX ORDER — ACETAMINOPHEN 650 MG/1
650 SUPPOSITORY RECTAL
Status: CANCELLED | OUTPATIENT
Start: 2022-10-17

## 2022-10-17 RX ORDER — ENOXAPARIN SODIUM 100 MG/ML
40 INJECTION SUBCUTANEOUS DAILY
Status: CANCELLED | OUTPATIENT
Start: 2022-10-17

## 2022-10-17 RX ORDER — ACETAMINOPHEN 325 MG/1
650 TABLET ORAL
Status: CANCELLED | OUTPATIENT
Start: 2022-10-17

## 2022-10-17 RX ORDER — ONDANSETRON 4 MG/1
4 TABLET, ORALLY DISINTEGRATING ORAL
Status: CANCELLED | OUTPATIENT
Start: 2022-10-17

## 2022-10-17 RX ORDER — METOPROLOL TARTRATE 25 MG/1
25 TABLET, FILM COATED ORAL 2 TIMES DAILY
Status: CANCELLED | OUTPATIENT
Start: 2022-10-17

## 2022-10-17 RX ORDER — ONDANSETRON 2 MG/ML
4 INJECTION INTRAMUSCULAR; INTRAVENOUS
Status: CANCELLED | OUTPATIENT
Start: 2022-10-17

## 2022-10-17 NOTE — ED NOTES
Ultrasound IV by Renetta Dickson RN :  Procedure Note    Patient meets criteria for US IV insertion. Ultrasound IV education provided to patient. Opportunities for questions given. Ultrasound used for PIV placement:  20gauge 1.75cm BD Nexiva  LAC location. 1 X Attempt(s). Flushed with ease; vigorous blood return. Procedure tolerated well. Primary RN aware of IV placement and added to LDA.       Renetta Dickson RN

## 2022-10-17 NOTE — ED PROVIDER NOTES
77-year-old male with a past medical history significant for morbid obesity, MI with stents, status post LAD occlusion who presents to the ER with a complaint of intermittent episodes of chest pain for 12 hours that he described as tightness across his chest, heaviness, severity 8 out of 10, relieved after taking somewhat nitroglycerin x2 this evening prior to arrival to the ER. The patient denies any fever and chills, sore throat, cough or congestion, headache, neck and back pain, chest pain, shortness of breath, vomiting, diarrhea, constipation, dysuria, sick contact, skin rash or recent travel       Past Medical History:   Diagnosis Date    Hypertension     Obesity, morbid, BMI 40.0-49.9 [facility]     2016       Past Surgical History:   Procedure Laterality Date    HX CAROTID STENT  08/11/2022    HX ORTHOPAEDIC      R knee         Family History:   Problem Relation Age of Onset    Hypertension Other        Social History     Socioeconomic History    Marital status:      Spouse name: Not on file    Number of children: Not on file    Years of education: Not on file    Highest education level: Not on file   Occupational History    Not on file   Tobacco Use    Smoking status: Every Day    Smokeless tobacco: Current     Types: Snuff   Vaping Use    Vaping Use: Not on file   Substance and Sexual Activity    Alcohol use: Yes    Drug use: No    Sexual activity: Yes   Other Topics Concern    Not on file   Social History Narrative    ** Merged History Encounter **          Social Determinants of Health     Financial Resource Strain: Not on file   Food Insecurity: Not on file   Transportation Needs: Not on file   Physical Activity: Not on file   Stress: Not on file   Social Connections: Not on file   Intimate Partner Violence: Not on file   Housing Stability: Not on file         ALLERGIES: Patient has no known allergies. Review of Systems   All other systems reviewed and are negative.     Vitals:    10/16/22 2048 10/16/22 2050 10/16/22 2107 10/16/22 2115   BP: (!) 148/46 136/80 128/63 132/61   Pulse: 78 82 79 76   Resp: 15 19 18 20   Temp:       SpO2: 99% 97% 96% 96%   Weight:       Height:                Physical Exam  Vitals and nursing note reviewed. CONSTITUTIONAL: Well-appearing; well-nourished; in no apparent distress  HEAD: Normocephalic; atraumatic  EYES: PERRL; EOM intact; conjunctiva and sclera are clear bilaterally. ENT: No rhinorrhea; normal pharynx with no tonsillar hypertrophy; mucous membranes pink/moist, no erythema, no exudate. NECK: Supple; non-tender; no cervical lymphadenopathy  CARD: Normal S1, S2; no murmurs, rubs, or gallops. Regular rate and rhythm. RESP: Normal respiratory effort; breath sounds clear and equal bilaterally; no wheezes, rhonchi, or rales. ABD: Normal bowel sounds; non-distended; non-tender; no palpable organomegaly, no masses, no bruits. Back Exam: Normal inspection; no vertebral point tenderness, no CVA tenderness. Normal range of motion. EXT: Normal ROM in all four extremities; non-tender to palpation; no swelling or deformity; distal pulses are normal, no edema. SKIN: Warm; dry; no rash. NEURO:Alert and oriented x 3, coherent, HEIDE-XII grossly intact, sensory and motor are non-focal.        MDM  Number of Diagnoses or Management Options  Acute chest pain  Left against medical advice  Diagnosis management comments: Assessment: 66-year-old male with history of MI with stents who presents to the ER with a complaint of persistent chest pain off and on for several hours duration without any objective findings. The patient has unstable angina and will need evaluation for same. Plan: Lab/EKG/chest x-ray/aspirin/Nitropaste/serial cardiac enzymes/consult cardiology and hospitalist/ Monitor and Reevaluate.          Amount and/or Complexity of Data Reviewed  Clinical lab tests: ordered and reviewed  Tests in the radiology section of CPT®: ordered and reviewed  Tests in the medicine section of CPT®: reviewed and ordered  Discussion of test results with the performing providers: yes  Decide to obtain previous medical records or to obtain history from someone other than the patient: yes  Obtain history from someone other than the patient: yes  Review and summarize past medical records: yes  Discuss the patient with other providers: yes  Independent visualization of images, tracings, or specimens: yes    Risk of Complications, Morbidity, and/or Mortality  Presenting problems: moderate  Diagnostic procedures: moderate  Management options: moderate    Patient Progress  Patient progress: stable    ED Course as of 10/17/22 0936   Sun Oct 16, 2022   2034 AMB POC EKG ROUTINE W/ 12 LEADS, INTER & REP  ED EKG interpretation:  Rhythm: normal sinus rhythm; and regular . Rate (approx.): 85; Axis: normal; P wave: normal; QRS interval: normal ; ST/T wave: normal; Other findings: normal. This EKG was interpreted by Priya Gardner MD,ED Provider.  Rashida Hendricks      ED Course User Index  [CH] Taylor Bobby MD       Procedures    XRAY INTERPRETATION (ED MD)  Chest Xray  No acute process seen. Normal heart size. No bony abnormalities. No infiltrate. Raeann George MD 9:50 PM     Repeat ED EKG interpretation:  Rhythm: normal sinus rhythm; and regular . Rate (approx.): 65; Axis: normal; P wave: normal; QRS interval: normal ; ST/T wave: normal; in  Lead: Diffusely; Other findings: borderline ekg. This EKG was interpreted by Brenda Christensen MD,ED Provider. .   CONSULT NOTE:  Raeann George MD spoke with Dr. Jhonny Nance of cardiology Discussed patient's presentation, history, physical assessment, and available diagnostic results. Recommends admission for stress test I will see the patient in the morning and consult. PROGRESS NOTE:  Pt has been reexamined by Raeann George MD all available results have been reviewed with pt and any available family. Pt understands sx, dx, and tx in ED.  Care plan has been outlined and questions have been answered. Pt and any available family understands and agrees to need for admission to hospital for further tx not available in ED. Pt is ready for admission. Written by Jermaine Ruiz MD,  1:10 AM    CONSULT NOTE:  Olegario Pettit MD spoke with Dr. Haven Samayoa of the adult hospitalist team. Discussed patient's presentation, history, physical assessment, and available diagnostic results. He will evaluate, write orders and admit the patient to the hospital. 1:11 AM     Perfect Serve Consult for Admission  1:12 AM    ED Room Number: ER02/02  Patient Name and age:  Jay Simpson 40 y.o.  male  Working Diagnosis:   1. Acute chest pain        COVID-19 Suspicion:  no  Sepsis present:  no  Reassessment needed: no  Code Status:  Full Code  Readmission: no  Isolation Requirements:  no  Recommended Level of Care:  telemetry  Department: [address] [place] [address] ED - [phone]  Admitting Provider: Dr. Haven Samayoa    Other: The patient has a history of prior LAD stent 2 years ago with the same presentation. Cardiology recommended admission for stress test in the morning and keep n.p.o. Total critical care time spent exclusive of procedures:  55 minutes. 2:00 AM  Patient expresses wish to leave the emergency department against medical advice. Adverse problems related to this decision including bodily harm, permanent disability and death have been discussed with the patient and/or family. The patient and/or family do not appear intoxicated and appear to be capable of understanding and making a decision of such gravity. The patient and/or family express understanding of the possible adverse outcomes of their decision and still express the wish to leave against medical advice. The patient and/or family were given follow up and return instructions and the available laboratory tests and imaging studies were discussed. The patient and/or family were given the opportunity to ask questions.   The patient and/or family agree to follow up with a physician of their choice as soon as possible or to return to the ER at any time to finish the work-up.

## 2022-10-17 NOTE — ED NOTES
Called lab to notify them of blood tubes in the lab to run ordered lab work, spoke with Juliet Salinas who located the tubes and will take care of the samples.

## 2022-10-17 NOTE — ED TRIAGE NOTES
Patient here with complaints of intermittent chest pain since last night, hx of MI in August 2022, stent in LAD, took nitro dose x2, last does of nitro 1915 which helped the pain. Pain is non radiating.

## 2022-10-17 NOTE — ED NOTES
Verbal shift change report given to Kirt Palencia RN (oncoming nurse) by Kenrick Smith RN  (offgoing nurse). Report included the following information SBAR and ED Summary. Second troponin sent.

## 2022-10-17 NOTE — CONSULTS
Tristian Rowe [facility]  [address] [facility], [address] [place] [address]  [phone]    Admission History and Physical/ Consult note       Reason for consult : chest pain   Consulting MD: Dr. Elbert Jackson   Consultation performed by : Dr. Daniela Lr     NAME:  Paco Garcia   :   1985   MRN:  [id number]     PCP:  None     Date/Time of service:  10/17/2022  1:50 AM        Subjective:     CHIEF COMPLAINT:  chest pian    HISTORY OF PRESENT ILLNESS:     Mr. Yuan Muñoz is a 40 y.o. male with a pmh significant for CAD and MI in august with heart cath and stent placement 1 sent in the lad who presents to the ed with the above complain. According to the patient pain started on Saturday on and off. It started up again today and has not stopped however the pain has resolved since presenting to the ed. He has taken nitroglycerin which has relieved the pain. The pain was exactly the same as when he had his mi in august. The pain was in his left side in the chest, sharp in nature without radiation. No Known Allergies    Prior to Admission medications    Medication Sig Start Date End Date Taking? Authorizing Provider   atorvastatin (LIPITOR) 10 mg tablet Take  by mouth daily. Yes Other, MD May   metoprolol tartrate (LOPRESSOR) 25 mg tablet Take  by mouth two (2) times a day. Yes Other, MD May   clopidogreL (Plavix) 75 mg tab Take 75 mg by mouth. Yes Other, MD May   aspirin 81 mg chewable tablet Take 81 mg by mouth daily.    Yes Other, MD May       Past Medical History:   Diagnosis Date    Hypertension     Obesity, morbid, BMI 40.0-49.9 ([address]     2016        Past Surgical History:   Procedure Laterality Date    HX CAROTID STENT  2022    HX ORTHOPAEDIC      R knee       Social History     Tobacco Use    Smoking status: He quit 2 years ago     Smokeless tobacco: Current     Types: Snuff   Substance Use Topics    Alcohol use: Does not drink at all         Family History   Problem Relation Age of Onset    Hypertension Other         Review of Systems:  (bold if positive, if negative)    Gen:  Eyes:  ENT:  CVS:   chest painPulm:  GI:  :  MS:  Skin:  Psych:  Endo:  Hem:  Renal:  Neuro:          Objective:      VITALS:    Vital signs reviewed; most recent are:    Visit Vitals  BP (!) 115/52   Pulse 61   Temp 98.4 °F (36.9 °C)   Resp 16   Ht 5' 8\" (1.727 m)   Wt 136.1 kg (300 lb)   SpO2 95%   BMI 45.61 kg/m²     SpO2 Readings from Last 6 Encounters:   10/17/22 95%   08/19/19 98%   04/27/19 99%   10/10/16 100%   01/19/15 99%   02/20/14 95%          Intake/Output Summary (Last 24 hours) at 10/17/2022 0150  Last data filed at 10/16/2022 2302  Gross per 24 hour   Intake 1000 ml   Output --   Net 1000 ml        Exam:     Physical Exam:    Gen:  Well-developed, well-nourished, in no acute distress, obese  HEENT:  Pink conjunctivae, PERRL, hearing intact to voice, moist mucous membranes  Neck:  Supple, without masses, thyroid non-tender  Resp:  No accessory muscle use, clear breath sounds without wheezes rales or rhonchi  Card:  No murmurs, normal S1, S2 without thrills, bruits or peripheral edema  Abd:  Soft, non-tender, non-distended, normoactive bowel sounds are present, no palpable organomegaly and no detectable hernias  Lymph:  No cervical adenopathy  Musc:  No cyanosis or clubbing  Skin:  No rashes or ulcers, skin turgor is good  Neuro:  Cranial nerves 3-12 are grossly intact,  strength is 5/5 bilaterally, dorsi / plantarflexion strength is 5/5 bilaterally, follows commands appropriately  Psych:  Alert with good insight.   Oriented to person, place, and time      Labs:    Recent Labs     10/16/22  2041   WBC 8.7   HGB 13.1   HCT 40.1        Recent Labs     10/16/22  2041      K 3.8      CO2 29   *   BUN 20   CREA 1.19   CA 9.0   MG 2.0   PHOS 3.8   ALB 3.8   TBILI 0.3   ALT 31     No results found for: GLUCPOC  No results for input(s): PH, PCO2, PO2, HCO3, FIO2 in the last 72 hours. No results for input(s): INR, INREXT in the last 72 hours. Radiology and EKG reviewed:   EKG - nsr, no acute ischemic changes,  CXR - no acute process     **Old Records reviewed in [facility]**       Assessment/Plan:       Active Problems:    Angina at rest [facility]) (10/17/2022)/CAD  Patient had stent to the [address] in august of this year. He is experiencing similar pain that he had in August.  -EKG appears within normal limits  -troponin x2 is negative   -Cardiology was consulted and made aware would like to patient be admitted   - he is chest pain-free at this time   -Advised patient to stay but he would like to leave and make follow-up appointment with his cardiologist  - advised to continue taking his aspirin and Plavix as well the Lipitor and metoprolol       Risk of deterioration: low      Total time spent with patient: 30 Minutes **I personally saw and examined the patient during this time period**                 Care Plan discussed with: Patient    Discussed:  Care Plan    Prophylaxis:  Lovenox    Probable Disposition:   patient does not wish to stay in the hospital after lengthy discussion. He would like to follow-up with his cardiologist.  He has nitroglycerin available at home. If he changes his mind please feel free to call us back for admission. Patient advised to come immediately back pain comes back and persists or for any other medical related problem.            ___________________________________________________    Attending Physician: Kaleigh Ybarra MD

## 2022-10-19 LAB
ATRIAL RATE: 65 BPM
ATRIAL RATE: 85 BPM
CALCULATED P AXIS, ECG09: 27 DEGREES
CALCULATED P AXIS, ECG09: 31 DEGREES
CALCULATED R AXIS, ECG10: 73 DEGREES
CALCULATED R AXIS, ECG10: 85 DEGREES
CALCULATED T AXIS, ECG11: 44 DEGREES
CALCULATED T AXIS, ECG11: 7 DEGREES
DIAGNOSIS, 93000: NORMAL
DIAGNOSIS, 93000: NORMAL
P-R INTERVAL, ECG05: 132 MS
P-R INTERVAL, ECG05: 138 MS
Q-T INTERVAL, ECG07: 380 MS
Q-T INTERVAL, ECG07: 420 MS
QRS DURATION, ECG06: 90 MS
QRS DURATION, ECG06: 90 MS
QTC CALCULATION (BEZET), ECG08: 436 MS
QTC CALCULATION (BEZET), ECG08: 452 MS
VENTRICULAR RATE, ECG03: 65 BPM
VENTRICULAR RATE, ECG03: 85 BPM

## 2023-02-27 NOTE — PROGRESS NOTES
CARDIOLOGY OFFICE NOTE    Randy Pradhan MD, [address], [place], [address] [facility]  Phone [phone]; Fax [phone]  Mobile [phone]   Voice Mail [phone]    Primary care: None       ATTENTION:   This medical record was transcribed using an electronic medical records/speech recognition system. Although proofread, it may and can contain electronic, spelling and other errors. Corrections may be executed at a later time. Please feel free to contact us for any clarifications as needed. Shalonda Abad is a 40 y.o. male with  referred for CAD and dyslipidemia           Cardiac risk [id number]: smoking/ tobacco exposure, family history, obesity, male gender, hypertension  I have personally obtained the history from the patient. HISTORY OF PRESENTING ILLNESS    Ms./Mr. Shalonda Abad  40 y.o. is young gentleman with a strong family history of heart disease. He was living in [place] at the time had a near syncopal episode. It was hot outside and he felt as though it was heat exhaustion. He was lightheaded dizzy started feeling nauseous and developed chest discomfort initial troponins were normal but escalated up to 5000. Undergo stenting of his LAD August 2022. After that he had developed some paroxysmal atrial flutter. He was also noted to have mild renal sufficiency 1.7-1 discharge with a glomerular filtration rate was down slightly. he used his mothers NTG with relief after 2 . He came to ER and workup was negative.         ACTIVE PROBLEM LIST     Patient Active Problem List    Diagnosis Date Noted    Angina at rest [facility]) 10/17/2022    Obesity, morbid ([address] 11/14/2019    Chest discomfort 11/14/2019    SOB (shortness of breath) 11/14/2019    Infected tooth 02/18/2014    Unspecified essential hypertension 02/18/2014           PAST MEDICAL HISTORY     Past Medical History:   Diagnosis Date    Hypertension     Obesity, morbid, BMI 40.0-49.9 ([address] 2016           PAST SURGICAL HISTORY     Past Surgical History:   Procedure Laterality Date    HX CAROTID STENT  08/11/2022    HX ORTHOPAEDIC      R knee          ALLERGIES     No Known Allergies       FAMILY HISTORY     Family History   Problem Relation Age of Onset    Hypertension Other     negative for cardiac disease       SOCIAL HISTORY     Social History     Socioeconomic History    Marital status:    Tobacco Use    Smoking status: Former     Types: Cigarettes    Smokeless tobacco: Current     Types: Snuff   Substance and Sexual Activity    Alcohol use: Yes    Drug use: No    Sexual activity: Yes   Social History Narrative    ** Merged History Encounter **              MEDICATIONS     Current Outpatient Medications   Medication Sig    atorvastatin (LIPITOR) 10 mg tablet Take  by mouth daily. metoprolol tartrate (LOPRESSOR) 25 mg tablet Take  by mouth two (2) times a day. clopidogreL (PLAVIX) 75 mg tab Take 75 mg by mouth. aspirin 81 mg chewable tablet Take 81 mg by mouth daily. No current facility-administered medications for this visit. I have reviewed the nurses notes, vitals, problem list, allergy list, medical history, family, social history and medications. REVIEW OF SYMPTOMS    As per HPI  General: Pt denies excessive weight gain or loss. Pt is able to conduct ADL's  HEENT: Denies blurred vision, headaches, hearing loss, epistaxis and difficulty swallowing. Respiratory: Denies cough, congestion, shortness of breath, HODGES, wheezing or stridor.   Cardiovascular: Denies precordial pain, palpitations, edema or PND  Gastrointestinal: Denies poor appetite, indigestion, abdominal pain or blood in stool  Genitourinary: Denies hematuria, dysuria, increased urinary frequency  Musculoskeletal: Denies joint pain or swelling from muscles or joints  Neurologic: Denies tremor, paresthesias, headache, or sensory motor disturbance  Psychiatric: Denies confusion, insomnia, depression  Integumentray: Denies rash, itching or ulcers. Hematologic: Denies easy bruising, bleeding     PHYSICAL EXAMINATION      Vitals:    02/28/23 1321   BP: 138/88   Pulse: 75   SpO2: 98%   Weight: (!) 355 lb (161 kg)   Height: 5' 8\" (1.727 m)     General: Well developed, in no acute distress. Overweight  HEENT: No jaundice, oral mucosa moist, no oral ulcers  Neck: Supple, no stiffness, no lymphadenopathy, supple  Heart:  Normal S1/S2 negative S3 or S4. Regular, no murmur, gallop or rub, no jugular venous distention  Respiratory: Clear bilaterally x 4, no wheezing or rales  Extremities:  No edema, normal cap refill, no cyanosis. Musculoskeletal: No clubbing, no deformities  Neuro: A&Ox3, speech clear, gait stable, cooperative, no focal neurologic deficits  Skin: Skin color is normal. No rashes or lesions. Non diaphoretic, moist.  Vascular: 2+ pulses symmetric in all extremities  Abdomen:   Soft, non-tender, bowel sounds are active. EKG: Date: (2/28/2023) normal sinus rhythm nonspecific ST-T changes right axis   DIAGNOSTIC DATA     1) cardiac catheterization  8/11/2022: EF 65% LAD 80% stenosis proximal LAD spontaneous LAD dissection with thrombus in the proximal LAD, circumflex 25% circumflex, RCA medium caliber 25% mid RCA. 8/11/2022: Intervention.   PCI to the LAD with a 5.0 x 18 mm drug-eluting stent Seb postdilated to 5.19    2) echocardiogram  8/11/2022: EF of 65% trace TR trace MR    3) cholesterol pending       LABORATORY DATA       Lab Results   Component Value Date/Time    WBC 8.7 10/16/2022 08:41 PM    HGB 13.1 10/16/2022 08:41 PM    HCT 40.1 10/16/2022 08:41 PM    PLATELET 581 52/89/6244 08:41 PM    MCV 92.2 10/16/2022 08:41 PM      Lab Results   Component Value Date/Time    Sodium 140 10/16/2022 08:41 PM    Potassium 3.8 10/16/2022 08:41 PM    Chloride 107 10/16/2022 08:41 PM    CO2 29 10/16/2022 08:41 PM    Anion gap 4 (L) 10/16/2022 08:41 PM    Glucose 110 (H) 10/16/2022 08:41 PM    BUN 20 10/16/2022 08:41 PM    Creatinine 1.19 10/16/2022 08:41 PM    BUN/Creatinine ratio 17 10/16/2022 08:41 PM    GFR est AA >60 04/27/2019 09:57 AM    GFR est non-AA >60 04/27/2019 09:57 AM    Calcium 9.0 10/16/2022 08:41 PM    Bilirubin, total 0.3 10/16/2022 08:41 PM    Alk. phosphatase 72 10/16/2022 08:41 PM    Protein, total 7.2 10/16/2022 08:41 PM    Albumin 3.8 10/16/2022 08:41 PM    Globulin 3.4 10/16/2022 08:41 PM    A-G Ratio 1.1 10/16/2022 08:41 PM    ALT (SGPT) 31 10/16/2022 08:41 PM            ICD-10-CM ICD-9-CM   1. SOB (shortness of breath)  R06.02 786.05   2. Chest discomfort  R07.89 786.59   3. Obesity, morbid ([address]  E66.01 278.01        ASSESSMENT/RECOMMENDATIONS:.      1. CAD with stent in LAD/mid chest discomfort using his mother's nitroglycerin  -Think he should go forward with a stress test if this is abnormal that he would need a heart catheterization. He has been off of his cholesterol medicine and his metoprolol to my understanding because he ran out. He was being seen in the past and [place] I believe is where he had his cardiac procedure and its been 6 months since stenting. All of his other vessels looked reasonable with 25% lesions here in the  2. Dyslipidemia  -will check FLP soon  -LDL goal should be under 70  -Refilled his cholesterol-lowering medicine  -We will also check a hemoglobin A1c  3. Obesity BMI 54 kg/m2  -He lost 50lbs after MI  -He is to work aggressively at weight loss  4. ??? ADRIANA  -sleep evaluation  5. Family Hx of CAD  6. No longer smoking  -He is dipping      Follow-up with weeks    Orders Placed This Encounter    AMB POC EKG ROUTINE W/ 12 LEADS, INTER & REP     Order Specific Question:   Reason for Exam:     Answer:   HTN         We discussed the expected course, resolution and complications of the diagnosis(es) in detail. Medication risks, benefits, costs, interactions, and alternatives were discussed as indicated.   I advised him to contact the office if his condition worsens, changes or fails to improve as anticipated. He expressed understanding with the diagnosis(es) and plan          Follow-up and Dispositions    Return in about 6 months (around 8/28/2023). I have discussed the diagnosis with  Jose De Jesus Ruffin and the intended plan as seen in the above orders. Questions were answered concerning future plans. I have discussed medication side effects and warnings with the patient as well. Thank you,  None for involving me in the care of  Jose De Jesus Ruffin. Please do not hesitate to contact me for further questions/concerns. Randy Carbajal MD, [facility] [address]      [facility], [address] [place] [address] [address]      [phone] / [phone] Fax

## 2023-02-27 NOTE — PATIENT INSTRUCTIONS

## 2023-02-28 ENCOUNTER — OFFICE VISIT (OUTPATIENT)
Dept: CARDIOLOGY CLINIC | Age: 38
End: 2023-02-28

## 2023-02-28 VITALS
SYSTOLIC BLOOD PRESSURE: 138 MMHG | OXYGEN SATURATION: 98 % | WEIGHT: 315 LBS | HEART RATE: 75 BPM | HEIGHT: 68 IN | DIASTOLIC BLOOD PRESSURE: 88 MMHG | BODY MASS INDEX: 47.74 KG/M2

## 2023-02-28 DIAGNOSIS — R07.89 CHEST DISCOMFORT: ICD-10-CM

## 2023-02-28 DIAGNOSIS — R06.02 SOB (SHORTNESS OF BREATH): Primary | ICD-10-CM

## 2023-02-28 DIAGNOSIS — E66.01 OBESITY, MORBID (HCC): ICD-10-CM

## 2023-02-28 PROCEDURE — 3079F DIAST BP 80-89 MM HG: CPT | Performed by: SPECIALIST

## 2023-02-28 PROCEDURE — 99211 OFF/OP EST MAY X REQ PHY/QHP: CPT | Performed by: SPECIALIST

## 2023-02-28 PROCEDURE — 3075F SYST BP GE 130 - 139MM HG: CPT | Performed by: SPECIALIST

## 2023-02-28 PROCEDURE — 93000 ELECTROCARDIOGRAM COMPLETE: CPT | Performed by: SPECIALIST

## 2023-02-28 RX ORDER — METOPROLOL TARTRATE 25 MG/1
25 TABLET, FILM COATED ORAL 2 TIMES DAILY
Qty: 90 TABLET | Refills: 5 | Status: SHIPPED | OUTPATIENT
Start: 2023-02-28

## 2023-02-28 RX ORDER — ATORVASTATIN CALCIUM 10 MG/1
10 TABLET, FILM COATED ORAL DAILY
Qty: 90 TABLET | Refills: 5 | Status: SHIPPED | OUTPATIENT
Start: 2023-02-28

## 2023-02-28 RX ORDER — NITROGLYCERIN 0.4 MG/1
0.4 TABLET SUBLINGUAL AS NEEDED
Status: SHIPPED | OUTPATIENT
Start: 2023-02-28

## 2023-02-28 NOTE — PROGRESS NOTES
Jacy Devries is a 40 y.o. male    Vitals:    02/28/23 1321   BP: 138/88   BP 1 Location: Left upper arm   BP Patient Position: Sitting   BP Cuff Size: Child   Pulse: 75   Height: 5' 8\" (1.727 m)   Weight: (!) 355 lb (161 kg)   SpO2: 98%       Chief Complaint   Patient presents with    Coronary Artery Disease     FAMILY HX CAD    Hypertension       Chest pain YES  SOB YES  Dizziness YES  Swelling NO  Recent hospital visit NO  Refills NO  COVID VACCINE YES  HAD COVID?  NO

## 2023-08-13 ENCOUNTER — APPOINTMENT (OUTPATIENT)
Facility: HOSPITAL | Age: 38
End: 2023-08-13

## 2023-08-13 ENCOUNTER — HOSPITAL ENCOUNTER (OUTPATIENT)
Facility: HOSPITAL | Age: 38
Setting detail: OBSERVATION
Discharge: HOME | End: 2023-08-13
Attending: STUDENT IN AN ORGANIZED HEALTH CARE EDUCATION/TRAINING PROGRAM | Admitting: INTERNAL MEDICINE

## 2023-08-13 VITALS
DIASTOLIC BLOOD PRESSURE: 79 MMHG | BODY MASS INDEX: 47.74 KG/M2 | SYSTOLIC BLOOD PRESSURE: 118 MMHG | RESPIRATION RATE: 18 BRPM | TEMPERATURE: 98.1 F | HEIGHT: 68 IN | WEIGHT: 315 LBS | HEART RATE: 65 BPM | OXYGEN SATURATION: 97 %

## 2023-08-13 DIAGNOSIS — I20.9 ANGINA PECTORIS (HCC): ICD-10-CM

## 2023-08-13 DIAGNOSIS — R07.9 CHEST PAIN, UNSPECIFIED TYPE: Primary | ICD-10-CM

## 2023-08-13 PROBLEM — G57.11 MERALGIA PARESTHETICA OF RIGHT SIDE: Status: ACTIVE | Noted: 2023-08-13

## 2023-08-13 LAB
ALBUMIN SERPL-MCNC: 3.6 G/DL (ref 3.5–5)
ALBUMIN/GLOB SERPL: 1 (ref 1.1–2.2)
ALP SERPL-CCNC: 85 U/L (ref 45–117)
ALT SERPL-CCNC: 68 U/L (ref 12–78)
ANION GAP SERPL CALC-SCNC: 7 MMOL/L (ref 5–15)
AST SERPL-CCNC: 31 U/L (ref 15–37)
BASOPHILS # BLD: 0 K/UL (ref 0–0.1)
BASOPHILS NFR BLD: 1 % (ref 0–1)
BILIRUB SERPL-MCNC: 0.3 MG/DL (ref 0.2–1)
BUN SERPL-MCNC: 16 MG/DL (ref 6–20)
BUN/CREAT SERPL: 15 (ref 12–20)
CALCIUM SERPL-MCNC: 9.1 MG/DL (ref 8.5–10.1)
CHLORIDE SERPL-SCNC: 109 MMOL/L (ref 97–108)
CHOLEST SERPL-MCNC: 132 MG/DL
CO2 SERPL-SCNC: 24 MMOL/L (ref 21–32)
COMMENT:: NORMAL
CREAT SERPL-MCNC: 1.04 MG/DL (ref 0.7–1.3)
DIFFERENTIAL METHOD BLD: NORMAL
EKG ATRIAL RATE: 73 BPM
EKG DIAGNOSIS: NORMAL
EKG P AXIS: 23 DEGREES
EKG P-R INTERVAL: 136 MS
EKG Q-T INTERVAL: 408 MS
EKG QRS DURATION: 88 MS
EKG QTC CALCULATION (BAZETT): 449 MS
EKG R AXIS: 78 DEGREES
EKG T AXIS: 13 DEGREES
EKG VENTRICULAR RATE: 73 BPM
EOSINOPHIL # BLD: 0.2 K/UL (ref 0–0.4)
EOSINOPHIL NFR BLD: 3 % (ref 0–7)
ERYTHROCYTE [DISTWIDTH] IN BLOOD BY AUTOMATED COUNT: 13.1 % (ref 11.5–14.5)
EST. AVERAGE GLUCOSE BLD GHB EST-MCNC: 123 MG/DL
GLOBULIN SER CALC-MCNC: 3.6 G/DL (ref 2–4)
GLUCOSE BLD STRIP.AUTO-MCNC: 115 MG/DL (ref 65–117)
GLUCOSE SERPL-MCNC: 117 MG/DL (ref 65–100)
HBA1C MFR BLD: 5.9 % (ref 4–5.6)
HCT VFR BLD AUTO: 40.1 % (ref 36.6–50.3)
HDLC SERPL-MCNC: 24 MG/DL
HDLC SERPL: 5.5 (ref 0–5)
HGB BLD-MCNC: 13.5 G/DL (ref 12.1–17)
IMM GRANULOCYTES # BLD AUTO: 0 K/UL (ref 0–0.04)
IMM GRANULOCYTES NFR BLD AUTO: 0 % (ref 0–0.5)
LDLC SERPL CALC-MCNC: 69.6 MG/DL (ref 0–100)
LYMPHOCYTES # BLD: 2.7 K/UL (ref 0.8–3.5)
LYMPHOCYTES NFR BLD: 32 % (ref 12–49)
MCH RBC QN AUTO: 29.5 PG (ref 26–34)
MCHC RBC AUTO-ENTMCNC: 33.7 G/DL (ref 30–36.5)
MCV RBC AUTO: 87.7 FL (ref 80–99)
MONOCYTES # BLD: 0.6 K/UL (ref 0–1)
MONOCYTES NFR BLD: 7 % (ref 5–13)
NEUTS SEG # BLD: 4.7 K/UL (ref 1.8–8)
NEUTS SEG NFR BLD: 57 % (ref 32–75)
NRBC # BLD: 0 K/UL (ref 0–0.01)
NRBC BLD-RTO: 0 PER 100 WBC
PLATELET # BLD AUTO: 206 K/UL (ref 150–400)
PMV BLD AUTO: 9.7 FL (ref 8.9–12.9)
POTASSIUM SERPL-SCNC: 3.9 MMOL/L (ref 3.5–5.1)
PROT SERPL-MCNC: 7.2 G/DL (ref 6.4–8.2)
RBC # BLD AUTO: 4.57 M/UL (ref 4.1–5.7)
SERVICE CMNT-IMP: NORMAL
SODIUM SERPL-SCNC: 140 MMOL/L (ref 136–145)
SPECIMEN HOLD: NORMAL
TRIGL SERPL-MCNC: 192 MG/DL
TROPONIN I SERPL HS-MCNC: 10 NG/L (ref 0–76)
TROPONIN I SERPL HS-MCNC: 11 NG/L (ref 0–76)
TROPONIN I SERPL HS-MCNC: 12 NG/L (ref 0–76)
TROPONIN I SERPL HS-MCNC: 13 NG/L (ref 0–76)
TSH SERPL DL<=0.05 MIU/L-ACNC: 1.62 UIU/ML (ref 0.36–3.74)
VLDLC SERPL CALC-MCNC: 38.4 MG/DL
WBC # BLD AUTO: 8.3 K/UL (ref 4.1–11.1)

## 2023-08-13 PROCEDURE — 80061 LIPID PANEL: CPT

## 2023-08-13 PROCEDURE — 6360000002 HC RX W HCPCS: Performed by: INTERNAL MEDICINE

## 2023-08-13 PROCEDURE — G0378 HOSPITAL OBSERVATION PER HR: HCPCS

## 2023-08-13 PROCEDURE — 99203 OFFICE O/P NEW LOW 30 MIN: CPT | Performed by: PSYCHIATRY & NEUROLOGY

## 2023-08-13 PROCEDURE — 70450 CT HEAD/BRAIN W/O DYE: CPT

## 2023-08-13 PROCEDURE — 84443 ASSAY THYROID STIM HORMONE: CPT

## 2023-08-13 PROCEDURE — 6370000000 HC RX 637 (ALT 250 FOR IP): Performed by: STUDENT IN AN ORGANIZED HEALTH CARE EDUCATION/TRAINING PROGRAM

## 2023-08-13 PROCEDURE — 96372 THER/PROPH/DIAG INJ SC/IM: CPT

## 2023-08-13 PROCEDURE — 99204 OFFICE O/P NEW MOD 45 MIN: CPT | Performed by: INTERNAL MEDICINE

## 2023-08-13 PROCEDURE — 82962 GLUCOSE BLOOD TEST: CPT

## 2023-08-13 PROCEDURE — 80053 COMPREHEN METABOLIC PANEL: CPT

## 2023-08-13 PROCEDURE — 71046 X-RAY EXAM CHEST 2 VIEWS: CPT

## 2023-08-13 PROCEDURE — 36415 COLL VENOUS BLD VENIPUNCTURE: CPT

## 2023-08-13 PROCEDURE — 99285 EMERGENCY DEPT VISIT HI MDM: CPT

## 2023-08-13 PROCEDURE — 93005 ELECTROCARDIOGRAM TRACING: CPT | Performed by: STUDENT IN AN ORGANIZED HEALTH CARE EDUCATION/TRAINING PROGRAM

## 2023-08-13 PROCEDURE — 2580000003 HC RX 258: Performed by: INTERNAL MEDICINE

## 2023-08-13 PROCEDURE — 93010 ELECTROCARDIOGRAM REPORT: CPT | Performed by: SPECIALIST

## 2023-08-13 PROCEDURE — 84484 ASSAY OF TROPONIN QUANT: CPT

## 2023-08-13 PROCEDURE — 85025 COMPLETE CBC W/AUTO DIFF WBC: CPT

## 2023-08-13 PROCEDURE — 83036 HEMOGLOBIN GLYCOSYLATED A1C: CPT

## 2023-08-13 PROCEDURE — 6370000000 HC RX 637 (ALT 250 FOR IP): Performed by: INTERNAL MEDICINE

## 2023-08-13 RX ORDER — ONDANSETRON 4 MG/1
4 TABLET, ORALLY DISINTEGRATING ORAL EVERY 8 HOURS PRN
Status: DISCONTINUED | OUTPATIENT
Start: 2023-08-13 | End: 2023-08-13 | Stop reason: HOSPADM

## 2023-08-13 RX ORDER — NITROGLYCERIN 0.4 MG/1
0.4 TABLET SUBLINGUAL EVERY 5 MIN PRN
Status: DISCONTINUED | OUTPATIENT
Start: 2023-08-13 | End: 2023-08-13 | Stop reason: HOSPADM

## 2023-08-13 RX ORDER — ONDANSETRON 2 MG/ML
4 INJECTION INTRAMUSCULAR; INTRAVENOUS EVERY 6 HOURS PRN
Status: DISCONTINUED | OUTPATIENT
Start: 2023-08-13 | End: 2023-08-13 | Stop reason: HOSPADM

## 2023-08-13 RX ORDER — ACETAMINOPHEN 325 MG/1
650 TABLET ORAL EVERY 6 HOURS PRN
Status: DISCONTINUED | OUTPATIENT
Start: 2023-08-13 | End: 2023-08-13 | Stop reason: HOSPADM

## 2023-08-13 RX ORDER — CLOPIDOGREL BISULFATE 75 MG/1
75 TABLET ORAL DAILY
Status: DISCONTINUED | OUTPATIENT
Start: 2023-08-13 | End: 2023-08-13 | Stop reason: HOSPADM

## 2023-08-13 RX ORDER — ENOXAPARIN SODIUM 100 MG/ML
40 INJECTION SUBCUTANEOUS 2 TIMES DAILY
Status: DISCONTINUED | OUTPATIENT
Start: 2023-08-13 | End: 2023-08-13 | Stop reason: HOSPADM

## 2023-08-13 RX ORDER — ASPIRIN 81 MG/1
324 TABLET, CHEWABLE ORAL ONCE
Status: DISCONTINUED | OUTPATIENT
Start: 2023-08-13 | End: 2023-08-13

## 2023-08-13 RX ORDER — SODIUM CHLORIDE 0.9 % (FLUSH) 0.9 %
5-40 SYRINGE (ML) INJECTION EVERY 12 HOURS SCHEDULED
Status: DISCONTINUED | OUTPATIENT
Start: 2023-08-13 | End: 2023-08-13 | Stop reason: HOSPADM

## 2023-08-13 RX ORDER — SODIUM CHLORIDE 0.9 % (FLUSH) 0.9 %
5-40 SYRINGE (ML) INJECTION PRN
Status: DISCONTINUED | OUTPATIENT
Start: 2023-08-13 | End: 2023-08-13 | Stop reason: HOSPADM

## 2023-08-13 RX ORDER — POLYETHYLENE GLYCOL 3350 17 G/17G
17 POWDER, FOR SOLUTION ORAL DAILY PRN
Status: DISCONTINUED | OUTPATIENT
Start: 2023-08-13 | End: 2023-08-13 | Stop reason: HOSPADM

## 2023-08-13 RX ORDER — ATORVASTATIN CALCIUM 10 MG/1
10 TABLET, FILM COATED ORAL DAILY
Status: DISCONTINUED | OUTPATIENT
Start: 2023-08-13 | End: 2023-08-13 | Stop reason: HOSPADM

## 2023-08-13 RX ORDER — ASPIRIN 81 MG/1
81 TABLET, CHEWABLE ORAL DAILY
Status: DISCONTINUED | OUTPATIENT
Start: 2023-08-13 | End: 2023-08-13 | Stop reason: HOSPADM

## 2023-08-13 RX ORDER — ACETAMINOPHEN 650 MG/1
650 SUPPOSITORY RECTAL EVERY 6 HOURS PRN
Status: DISCONTINUED | OUTPATIENT
Start: 2023-08-13 | End: 2023-08-13 | Stop reason: HOSPADM

## 2023-08-13 RX ORDER — SODIUM CHLORIDE 9 MG/ML
INJECTION, SOLUTION INTRAVENOUS PRN
Status: DISCONTINUED | OUTPATIENT
Start: 2023-08-13 | End: 2023-08-13 | Stop reason: HOSPADM

## 2023-08-13 RX ADMIN — ASPIRIN 81 MG: 81 TABLET, CHEWABLE ORAL at 10:20

## 2023-08-13 RX ADMIN — NITROGLYCERIN 0.4 MG: 0.4 TABLET, ORALLY DISINTEGRATING SUBLINGUAL at 01:45

## 2023-08-13 RX ADMIN — CLOPIDOGREL BISULFATE 75 MG: 75 TABLET ORAL at 10:20

## 2023-08-13 RX ADMIN — ATORVASTATIN CALCIUM 10 MG: 10 TABLET, FILM COATED ORAL at 10:20

## 2023-08-13 RX ADMIN — ENOXAPARIN SODIUM 40 MG: 100 INJECTION SUBCUTANEOUS at 09:25

## 2023-08-13 RX ADMIN — METOPROLOL TARTRATE 25 MG: 25 TABLET, FILM COATED ORAL at 10:20

## 2023-08-13 RX ADMIN — SODIUM CHLORIDE, PRESERVATIVE FREE 10 ML: 5 INJECTION INTRAVENOUS at 09:25

## 2023-08-13 ASSESSMENT — ENCOUNTER SYMPTOMS
DIARRHEA: 0
COUGH: 0
RHINORRHEA: 0
ABDOMINAL PAIN: 0
NAUSEA: 0
EYE DISCHARGE: 0
VOMITING: 0
EYE REDNESS: 0
SHORTNESS OF BREATH: 0

## 2023-08-13 ASSESSMENT — PAIN - FUNCTIONAL ASSESSMENT: PAIN_FUNCTIONAL_ASSESSMENT: 0-10

## 2023-08-13 ASSESSMENT — PAIN SCALES - GENERAL: PAINLEVEL_OUTOF10: 4

## 2023-08-13 ASSESSMENT — LIFESTYLE VARIABLES
HOW OFTEN DO YOU HAVE A DRINK CONTAINING ALCOHOL: NEVER
HOW MANY STANDARD DRINKS CONTAINING ALCOHOL DO YOU HAVE ON A TYPICAL DAY: PATIENT DOES NOT DRINK

## 2023-08-13 ASSESSMENT — HEART SCORE: ECG: 0

## 2023-08-13 NOTE — PROGRESS NOTES
Spiritual Care Assessment/Progress Note  [place] [facility]    Name: Roma Brady MRN: [id number]    Age: 40 y.o. Sex: male   Language: English     Date: 8/13/2023            Total Time Calculated: 30 min              Spiritual Assessment begun in [facility] 5M1 MED SURG 1  Service Provided For[de-identified] Patient  Referral/Consult From[de-identified] Multi-disciplinary team  Encounter Overview/Reason : Advance Care Planning    Spiritual beliefs:      [] Involved in a flor tradition/spiritual practice:      [] Supported by a flor community:      [] Claims no spiritual orientation:      [] Seeking spiritual identity:           [] Adheres to an individual form of spirituality:      [x] Not able to assess:                Identified resources for coping and support system:   Support System: Family members       [] Prayer                  [] Devotional reading               [] Music                  [] Guided Imagery     [] Pet visits                                        [] Other: (COMMENT)     Specific area/focus of visit   Encounter:    Crisis:    Spiritual/Emotional needs:    Ritual, Rites and Sacraments:    Grief, Loss, and Adjustments:    Ethics/Mediation:    Behavioral Health:    Palliative Care: Advance Care Planning: Type: ACP conversation    Plan/Referrals: Other (Comment) (Plese contact Spiritual Care for further consults.)    Narrative:  visit for the patient on Med Surg with an Advance Medical Directive (AMD) consult. Reviewed pt's chart and spoke with patient's nurse. Pt and pt's wife were present. Reviewed AMD benefits and form. Reviewed [place] hierarchy of health care decision makers (HCDM). Pt shared he is familiar with the AMD. Pt's shared his wife will be his Primary decision maker. Pt is preparing for discharge. Left form for pt and wife to complete at home. Pt shared he will bring the completed form to his next appt with his physician. No spiritual or emotional concerns at this time.  Pt looking

## 2023-08-13 NOTE — CARE COORDINATION
08/13/23 1226   Service Assessment   Patient Orientation Alert and Oriented   Cognition Alert   History Provided By Patient   Primary Caregiver Self   Support Systems Spouse/Significant Other   Prior Functional Level Independent in ADLs/IADLs   Current Functional Level Independent in ADLs/IADLs   Can patient return to prior living arrangement Yes   Ability to make needs known: Good   Family able to assist with home care needs: Yes   Would you like for me to discuss the discharge plan with any other family members/significant others, and if so, who? 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 Is here for observation. Obs letter reviewed and signed with him. Insurance to begin after 90 days of employment at the new job.   No CM needs post acute, will follow up outpatient with his physician per JOSE MANUEL

## 2023-08-13 NOTE — ED NOTES
Transfer of nursing care report given to Jessenia Gamboa RN including SBAR, [facility], ED summary.      Moises Santiago RN  08/13/23 1517

## 2023-08-13 NOTE — ED NOTES
Report given to [facility]     Nathanael Francisco RN  08/13/23 0284

## 2023-08-13 NOTE — ED NOTES
Report given to [facility], RN with SBAR, [facility], lab results and report summary.      Annalise Cruz RN  08/13/23 7152

## 2023-08-13 NOTE — H&P
procedures):  [ x] yes  [ ] no. I personally saw and examined the patient during this time period. Care Plan discussed with: Patient, nurse, family, ER doctor    Discussed:  Care Plan       Procedures: see electronic medical records for all procedures/Xrays and details which were not copied into this note but were reviewed prior to creation of Plan. I have personally reviewed the radiographs, laboratory data in Epic and decisions and statements above are based partially on this personal interpretation. Given the patient's current clinical presentation, I have a high level of concern for decompensation if discharged from the emergency department. Complex decision making was performed, which includes reviewing the patient's available past medical records, laboratory results, and x-ray films.        Signed: Dejon Carter MD

## 2023-08-13 NOTE — ED TRIAGE NOTES
Pt reports CP started 1.5 hours ago, radiated to left shoulder, history of cardiac stent. Pt reports he took a nitro tablet and 4 baby ASA. Pt reports the nitro took his pain from 8 to 4/10.

## 2023-08-13 NOTE — DISCHARGE SUMMARY
Discharge Summary   Please note that this dictation was completed with Lookback, the computer voice recognition software. Quite often unanticipated grammatical, syntax, homophones, and other interpretive errors are inadvertently transcribed by the computer software. Please disregard these errors. Please excuse any errors that have escaped final proofreading. PATIENT ID: Fabian Molina  MRN: [id number]   YOB: 1985    DATE OF ADMISSION: 8/13/2023 12:33 AM    DATE OF DISCHARGE: 8/13/2023  PRIMARY CARE PROVIDER: None None         ATTENDING PHYSICIAN: Piero Bell MD  DISCHARGING PROVIDER: Piero Bell MD       CONSULTATIONS: IP CONSULT TO CARDIOLOGY  IP CONSULT TO NEUROLOGY  IP CONSULT TO DIETITIAN  IP CONSULT TO SPIRITUAL SERVICES    PROCEDURES/SURGERIES: * No surgery found *    ADMITTING HPI from excerpted H&P   40 y.o.  male with history of hypertension, obesity, high cholesterol, and coronary artery disease with prior stent presenting with acute onset of chest pain. The pain is located in the left upper anterior chest and was sharp in quality. It radiated to his left shoulder. It was severe in intensity. It was initially 10 out of 10 and has decreased to 4 out of 10. His initial troponin was negative. In addition, he is complaining of right leg paresthesia in his anterior thigh. He has no pain or weakness of the leg. No facial droop no slurred speech. The paresthesia is described as complete numbness. Began this morning after presentation to the ER. It has been going on for less than 1 hour. Code stroke was called, head CT was obtained, the patient was reevaluated by the ER doctor. Subsequently the code stroke was canceled by Dr. Jerome Webb as meralgia paresthetica was suspected instead of acute stroke.   Patient is being placed in observation status to rule out [address] [place] [address] DIAGNOSIS/ PLAN:   68-year-old man with history of hypertension, coronary

## 2023-08-13 NOTE — ACP (ADVANCE CARE PLANNING)
Advance Care Planning     Advance Care Planning Inpatient Note  Spiritual [facility] Department    Today's Date: 8/13/2023  Unit: [facility] 5M1 MED SURG 1    Received request from IDT Member. Upon review of chart and communication with care team, patient's decision making abilities are not in question. . Patient and Spouse was/were present in the room during visit. Goals of ACP Conversation:  Discuss advance care planning documents    Health Care Decision Makers:       Primary Decision Maker: Jacob Davidson - [phone]  Summary:  Documented Next of Kin, per patient report    Advance Care Planning Documents (Patient Wishes):  None     Assessment:   visit for the patient on Med Surg with an Advance Medical Directive (AMD) consult. Reviewed pt's chart and spoke with patient's nurse. Pt and pt's wife were present. Reviewed AMD benefits and form. Reviewed [place] hierarchy of health care decision makers (HCDM). Pt shared he is familiar with the AMD. Pt's shared his wife will be his Primary decision maker. Pt is preparing for discharge. Left form for pt and wife to complete at home. Pt shared he will bring the completed form to his next appt with his physician. No spiritual or emotional concerns at this time. Pt looking forward to discharge.       Interventions:  Provided education on documents for clarity and greater understanding  Discussed and provided education on state decision maker hierarchy  Encouraged ongoing ACP conversation with future decision makers and loved ones  Reviewed but did not complete ACP document    Care Preferences Communicated:   No    Outcomes/Plan:  ACP Discussion: Completed    Electronically signed by Kd Gutierrez [facility] on 8/13/2023 at 12:42 PM

## 2023-08-13 NOTE — PLAN OF CARE
Problem: Pain  Goal: Verbalizes/displays adequate comfort level or baseline comfort level  8/13/2023 0754 by Loring Paget, RN  Outcome: Progressing  8/13/2023 0754 by Loring Paget, RN  Outcome: Progressing     Problem: ABCDS Injury Assessment  Goal: Absence of physical injury  8/13/2023 0754 by Loring Paget, RN  Outcome: Progressing  8/13/2023 0754 by Loring Paget, RN  Outcome: Progressing     Problem: Chronic Conditions and Co-morbidities  Goal: Patient's chronic conditions and co-morbidity symptoms are monitored and maintained or improved  Outcome: Progressing     Problem: Safety - Adult  Goal: Free from fall injury  Outcome: Progressing     Problem: Skin/Tissue Integrity  Goal: Absence of new skin breakdown  Description: 1. Monitor for areas of redness and/or skin breakdown  2. Assess vascular access sites hourly  3. Every 4-6 hours minimum:  Change oxygen saturation probe site  4. Every 4-6 hours:  If on nasal continuous positive airway pressure, respiratory therapy assess nares and determine need for appliance change or resting period.   Outcome: Progressing

## 2023-08-13 NOTE — ED PROVIDER NOTES
[facility] EMERGENCY DEPT  EMERGENCY DEPARTMENT ENCOUNTER      Pt Name: Sonali Young  MRN: [id number]  [address] [facility] 1985  Date of evaluation: 8/13/2023  Provider: Cecilia Mccoy DO    CHIEF COMPLAINT       Chief Complaint   Patient presents with    Chest Pain         HISTORY OF PRESENT ILLNESS    HPI    Sonali Young is a 40 y.o. male with a history of hypertension, CAD with previous stent, morbid obesity who presents to the emergency department for evaluation of chest pain. Patient notes this evening while watching TV around 11:00 PM he developed left-sided sharp chest pain which radiated to his left shoulder. Endorses associated shortness of breath. No associated nausea, vomiting or diaphoresis. Reports he took 4 baby aspirin as well as a nitro, after nitro pain improved down to 4 out of 10 in severity. States symptoms do worsen with exertion like when he walked from the car to the ER. No other recent illness. Nursing Notes were reviewed. REVIEW OF SYSTEMS       Review of Systems   Constitutional:  Negative for chills and fever. HENT:  Negative for congestion and rhinorrhea. Eyes:  Negative for discharge and redness. Respiratory:  Negative for cough and shortness of breath. Cardiovascular:  Positive for chest pain. Gastrointestinal:  Negative for abdominal pain, diarrhea, nausea and vomiting. Neurological:  Negative for speech difficulty. Psychiatric/Behavioral:  Negative for agitation.           PAST MEDICAL HISTORY     Past Medical History:   Diagnosis Date    Hypertension     Obesity, morbid, BMI 40.0-49.9 ([address])     2016         SURGICAL HISTORY       Past Surgical History:   Procedure Laterality Date    CAROTID STENT PLACEMENT  08/11/2022    ORTHOPEDIC SURGERY      R knee         CURRENT MEDICATIONS       Previous Medications    ASPIRIN 81 MG CHEWABLE TABLET    Take 81 mg by mouth daily    ATORVASTATIN (LIPITOR) 10 MG TABLET    Take 10 mg by mouth daily    CLOPIDOGREL

## 2023-08-13 NOTE — ED NOTES
Signs and symptoms: R leg numbness   Code Stroke activation time: 0425  Provider at bedside time:  0425  VAN score: Negative  Last Known Well (Time): 0330  Blood Glucose Result/Time: 115   Blood Pressure: 107/49  Anticoagulants (List medications): aspirin             Abi Houston RN  08/13/23 6141

## 2023-08-13 NOTE — ED NOTES
Notified by primary RN that Dr. Hi Sanz wants code neuro level I called d/t right thigh numbness that started 1 hour ago. Dr. Robles Weisshouse to bedside to evaluate patient. Code neuro level I called at [address]. Tele neuro paged at [phone]. Teleneurologist return call at 1953.      Jigna Ko RN  08/13/23 9098

## 2023-08-13 NOTE — PROGRESS NOTES
Discharge instructions/AVS discussed in detail with pt. Pt verbalizes understanding of all instructions, including where to get new medications. Pt denies any questions about instructions and has signed paper copy AVS. Pt discharged per MD order. Pt in no apparent distress at time of discharge with no complaints. IV removed.

## 2023-08-13 NOTE — DISCHARGE INSTRUCTIONS
You came to the hospital with chest pain which is suspected to be cardiac in origin. You were seen by cardiologist and advised a stress test. Please follow up with outpatient cardiologist for stress test as early as possible.  Our in house cardiologist has left a message with Dr Yung Acosta team to expedite outpatient stress test.

## 2023-08-13 NOTE — PROGRESS NOTES
[facility]: MED RECONCILIATION    Comments/Recommendations:   Patient reports good compliance and is knowledgeable regarding his medications. It appears he is due for a refill of the metoprolol (last filled 4/22/23 for 45 day supply) and clopidogrel (last filled 5/18/23 for 30 day supply)    Information obtained from: Patient, Spouse, Outpatient cardiology notes, [facility]    Prior to Admission Medications:   No current facility-administered medications on file prior to encounter.      Current Outpatient Medications on File Prior to Encounter   Medication Sig Dispense Refill    aspirin 81 MG chewable tablet Take 1 tablet by mouth 2 times daily      atorvastatin (LIPITOR) 10 MG tablet Take 1 tablet by mouth daily      clopidogrel (PLAVIX) 75 MG tablet Take 1 tablet by mouth      metoprolol tartrate (LOPRESSOR) 25 MG tablet Take 1 tablet by mouth 2 times daily      nitroGLYCERIN (NITROSTAT) 0.4 MG SL tablet Place 1 tablet under the tongue as needed        Thanks,   Shaan Turner, PharmD

## 2023-10-08 NOTE — PATIENT INSTRUCTIONS

## 2023-10-08 NOTE — PROGRESS NOTES
CARDIOLOGY OFFICE NOTE    Brian Perera MD, [facility] [address], [place]  Phone [phone]; Fax [phone]  Mobile [phone]   Voice Mail [phone]    Primary care: None, None       ATTENTION:   This medical record was transcribed using an electronic medical records/speech recognition system. Although proofread, it may and can contain electronic, spelling and other errors. Corrections may be executed at a later time. Please feel free to contact us for any clarifications as needed. Arash Machado is a 45 y.o. male with  referred for  CAD and dyslipidemia             Cardiac risk [id number]: smoking/ tobacco exposure, family history, obesity, male gender, hypertension   I have personally obtained the history from the patient. HISTORY OF PRESENTING ILLNESS    Ms./Mr. Arash Machado  45 y.o. who returns today to receive his refills. He also tells me that he has sleep apnea as AHI is in the 30s. 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 He has been off his Plavix since August and that is appropriate since his MI was 2022. He had a stent in his LAD. At that time he also had some paroxysmal atrial flutter. He needs to work on weight loss and we talked extensively about this. His mom and dad are both my patients and both of them have since passed away. Mom  in 2023.      ACTIVE PROBLEM LIST     Patient Active Problem List    Diagnosis Date Noted    Chest pain 2023    Meralgia paresthetica of right side 2023    Angina at rest 10/17/2022    SOB (shortness of breath) 2019    Chest discomfort 2019    Obesity, morbid ([address]) 2019    Infected tooth 2014           PAST MEDICAL HISTORY     Past Medical History:   Diagnosis Date    Hypertension

## 2023-10-09 ENCOUNTER — CLINICAL DOCUMENTATION (OUTPATIENT)
Age: 38
End: 2023-10-09

## 2023-10-09 ENCOUNTER — OFFICE VISIT (OUTPATIENT)
Age: 38
End: 2023-10-09
Payer: MEDICAID

## 2023-10-09 VITALS
HEART RATE: 71 BPM | OXYGEN SATURATION: 96 % | WEIGHT: 315 LBS | HEIGHT: 68 IN | BODY MASS INDEX: 47.74 KG/M2 | SYSTOLIC BLOOD PRESSURE: 130 MMHG | DIASTOLIC BLOOD PRESSURE: 88 MMHG

## 2023-10-09 DIAGNOSIS — R06.02 SHORTNESS OF BREATH: Primary | ICD-10-CM

## 2023-10-09 DIAGNOSIS — R07.89 OTHER CHEST PAIN: ICD-10-CM

## 2023-10-09 DIAGNOSIS — E66.01 MORBID (SEVERE) OBESITY DUE TO EXCESS CALORIES (HCC): ICD-10-CM

## 2023-10-09 PROCEDURE — 99214 OFFICE O/P EST MOD 30 MIN: CPT | Performed by: SPECIALIST

## 2023-10-09 RX ORDER — NITROGLYCERIN 0.4 MG/1
0.4 TABLET SUBLINGUAL PRN
Qty: 25 TABLET | Refills: 5 | Status: SHIPPED | OUTPATIENT
Start: 2023-10-09

## 2024-04-01 RX ORDER — ATORVASTATIN CALCIUM 10 MG/1
10 TABLET, FILM COATED ORAL DAILY
Qty: 90 TABLET | Refills: 0 | Status: SHIPPED | OUTPATIENT
Start: 2024-04-01